# Patient Record
Sex: FEMALE | Race: WHITE
[De-identification: names, ages, dates, MRNs, and addresses within clinical notes are randomized per-mention and may not be internally consistent; named-entity substitution may affect disease eponyms.]

---

## 2021-06-30 ENCOUNTER — HOSPITAL ENCOUNTER (EMERGENCY)
Age: 36
Discharge: HOME | End: 2021-06-30
Payer: COMMERCIAL

## 2021-06-30 VITALS
DIASTOLIC BLOOD PRESSURE: 67 MMHG | RESPIRATION RATE: 16 BRPM | TEMPERATURE: 98.78 F | HEART RATE: 74 BPM | OXYGEN SATURATION: 100 % | SYSTOLIC BLOOD PRESSURE: 119 MMHG

## 2021-06-30 VITALS
SYSTOLIC BLOOD PRESSURE: 119 MMHG | OXYGEN SATURATION: 100 % | HEART RATE: 74 BPM | TEMPERATURE: 98.8 F | DIASTOLIC BLOOD PRESSURE: 67 MMHG | RESPIRATION RATE: 16 BRPM

## 2021-06-30 VITALS
HEART RATE: 74 BPM | TEMPERATURE: 98.78 F | OXYGEN SATURATION: 100 % | DIASTOLIC BLOOD PRESSURE: 67 MMHG | RESPIRATION RATE: 16 BRPM | SYSTOLIC BLOOD PRESSURE: 119 MMHG

## 2021-06-30 VITALS — BODY MASS INDEX: 27.4 KG/M2 | BODY MASS INDEX: 27.3 KG/M2

## 2021-06-30 DIAGNOSIS — S61.210A: Primary | ICD-10-CM

## 2021-06-30 DIAGNOSIS — F33.1: ICD-10-CM
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
## 2021-06-30 PROCEDURE — 12001 RPR S/N/AX/GEN/TRNK 2.5CM/<: CPT

## 2021-06-30 PROCEDURE — 99202 OFFICE O/P NEW SF 15 MIN: CPT

## 2021-06-30 PROCEDURE — G0463 HOSPITAL OUTPT CLINIC VISIT: HCPCS

## 2021-09-05 ENCOUNTER — HOSPITAL ENCOUNTER (OUTPATIENT)
Age: 36
Discharge: HOME | End: 2021-09-05
Payer: COMMERCIAL

## 2021-09-05 VITALS
DIASTOLIC BLOOD PRESSURE: 64 MMHG | OXYGEN SATURATION: 98 % | HEART RATE: 76 BPM | SYSTOLIC BLOOD PRESSURE: 107 MMHG | RESPIRATION RATE: 16 BRPM

## 2021-09-05 VITALS
HEART RATE: 75 BPM | DIASTOLIC BLOOD PRESSURE: 63 MMHG | OXYGEN SATURATION: 97 % | SYSTOLIC BLOOD PRESSURE: 101 MMHG | RESPIRATION RATE: 16 BRPM

## 2021-09-05 VITALS
OXYGEN SATURATION: 98 % | HEART RATE: 82 BPM | SYSTOLIC BLOOD PRESSURE: 123 MMHG | DIASTOLIC BLOOD PRESSURE: 68 MMHG | RESPIRATION RATE: 16 BRPM

## 2021-09-05 VITALS
OXYGEN SATURATION: 97 % | DIASTOLIC BLOOD PRESSURE: 87 MMHG | TEMPERATURE: 99.14 F | RESPIRATION RATE: 16 BRPM | SYSTOLIC BLOOD PRESSURE: 120 MMHG | HEART RATE: 83 BPM

## 2021-09-05 VITALS
OXYGEN SATURATION: 97 % | SYSTOLIC BLOOD PRESSURE: 119 MMHG | HEART RATE: 74 BPM | DIASTOLIC BLOOD PRESSURE: 73 MMHG | RESPIRATION RATE: 16 BRPM

## 2021-09-05 VITALS
DIASTOLIC BLOOD PRESSURE: 65 MMHG | RESPIRATION RATE: 16 BRPM | OXYGEN SATURATION: 97 % | HEART RATE: 69 BPM | SYSTOLIC BLOOD PRESSURE: 102 MMHG

## 2021-09-05 VITALS
DIASTOLIC BLOOD PRESSURE: 71 MMHG | RESPIRATION RATE: 16 BRPM | SYSTOLIC BLOOD PRESSURE: 109 MMHG | OXYGEN SATURATION: 96 % | HEART RATE: 63 BPM

## 2021-09-05 VITALS
OXYGEN SATURATION: 98 % | HEART RATE: 80 BPM | RESPIRATION RATE: 16 BRPM | DIASTOLIC BLOOD PRESSURE: 75 MMHG | SYSTOLIC BLOOD PRESSURE: 116 MMHG

## 2021-09-05 DIAGNOSIS — U07.1: Primary | ICD-10-CM

## 2021-09-05 PROCEDURE — 96365 THER/PROPH/DIAG IV INF INIT: CPT

## 2021-09-16 ENCOUNTER — HOSPITAL ENCOUNTER (EMERGENCY)
Age: 36
Discharge: HOME | End: 2021-09-16
Payer: COMMERCIAL

## 2021-09-16 VITALS
HEART RATE: 75 BPM | DIASTOLIC BLOOD PRESSURE: 81 MMHG | RESPIRATION RATE: 16 BRPM | SYSTOLIC BLOOD PRESSURE: 128 MMHG | OXYGEN SATURATION: 100 % | TEMPERATURE: 98.2 F

## 2021-09-16 VITALS — HEART RATE: 75 BPM | RESPIRATION RATE: 16 BRPM | OXYGEN SATURATION: 100 % | TEMPERATURE: 98.2 F

## 2021-09-16 VITALS — BODY MASS INDEX: 27.9 KG/M2 | BODY MASS INDEX: 28 KG/M2

## 2021-09-16 DIAGNOSIS — S62.607A: Primary | ICD-10-CM

## 2021-09-16 DIAGNOSIS — Z88.2: ICD-10-CM

## 2021-09-16 PROCEDURE — 73130 X-RAY EXAM OF HAND: CPT

## 2021-09-16 PROCEDURE — 99202 OFFICE O/P NEW SF 15 MIN: CPT

## 2021-09-16 PROCEDURE — G0463 HOSPITAL OUTPT CLINIC VISIT: HCPCS

## 2021-09-17 ENCOUNTER — HOSPITAL ENCOUNTER (OUTPATIENT)
Age: 36
End: 2021-09-17
Payer: COMMERCIAL

## 2021-09-17 DIAGNOSIS — S62.657A: Primary | ICD-10-CM

## 2021-09-17 PROCEDURE — 73140 X-RAY EXAM OF FINGER(S): CPT

## 2022-04-19 ENCOUNTER — HOSPITAL ENCOUNTER (EMERGENCY)
Age: 37
Discharge: HOME | End: 2022-04-19
Payer: COMMERCIAL

## 2022-04-19 VITALS
TEMPERATURE: 98.06 F | HEART RATE: 76 BPM | RESPIRATION RATE: 18 BRPM | DIASTOLIC BLOOD PRESSURE: 81 MMHG | SYSTOLIC BLOOD PRESSURE: 129 MMHG

## 2022-04-19 VITALS
OXYGEN SATURATION: 99 % | SYSTOLIC BLOOD PRESSURE: 129 MMHG | TEMPERATURE: 98 F | RESPIRATION RATE: 18 BRPM | HEART RATE: 76 BPM | DIASTOLIC BLOOD PRESSURE: 81 MMHG

## 2022-04-19 VITALS — BODY MASS INDEX: 29.7 KG/M2

## 2022-04-19 DIAGNOSIS — Z79.899: ICD-10-CM

## 2022-04-19 DIAGNOSIS — J02.9: ICD-10-CM

## 2022-04-19 DIAGNOSIS — F33.1: ICD-10-CM

## 2022-04-19 DIAGNOSIS — H66.002: Primary | ICD-10-CM

## 2022-04-19 DIAGNOSIS — Z88.2: ICD-10-CM

## 2022-04-19 PROCEDURE — G0463 HOSPITAL OUTPT CLINIC VISIT: HCPCS

## 2022-04-19 PROCEDURE — 87430 STREP A AG IA: CPT

## 2022-04-19 PROCEDURE — 99212 OFFICE O/P EST SF 10 MIN: CPT

## 2023-09-20 ENCOUNTER — TELEPHONE (OUTPATIENT)
Dept: CARDIOLOGY | Facility: CLINIC | Age: 38
End: 2023-09-20
Payer: COMMERCIAL

## 2023-09-20 NOTE — TELEPHONE ENCOUNTER
LVM FOR PT TO CALL US BACK TO SCHEDULE NEW CARDIAC REFERRAL FROM DR. HORTENSIA MIRANDA.  1ST ATTEMPT.  HUB OK TO READ AND SCHEDULE WITH 1ST AVAILABLE APPT IN OUR [facility] OFFICE.  1ST APPT IS OK.

## 2023-09-27 ENCOUNTER — OFFICE VISIT (OUTPATIENT)
Dept: CARDIOLOGY | Facility: CLINIC | Age: 38
End: 2023-09-27
Payer: COMMERCIAL

## 2023-09-27 VITALS
DIASTOLIC BLOOD PRESSURE: 70 MMHG | BODY MASS INDEX: 24.86 KG/M2 | HEIGHT: 68 IN | SYSTOLIC BLOOD PRESSURE: 148 MMHG | HEART RATE: 84 BPM | OXYGEN SATURATION: 97 % | WEIGHT: 164 LBS

## 2023-09-27 DIAGNOSIS — R07.2 PRECORDIAL CHEST PAIN: Primary | ICD-10-CM

## 2023-09-27 PROCEDURE — 93000 ELECTROCARDIOGRAM COMPLETE: CPT | Performed by: NURSE PRACTITIONER

## 2023-09-27 PROCEDURE — 99204 OFFICE O/P NEW MOD 45 MIN: CPT | Performed by: NURSE PRACTITIONER

## 2023-09-27 RX ORDER — MONTELUKAST SODIUM 10 MG/1
1 TABLET ORAL DAILY
COMMUNITY
Start: 2023-08-01

## 2023-09-27 RX ORDER — DOXYCYCLINE HYCLATE 100 MG/1
100 TABLET, DELAYED RELEASE ORAL 2 TIMES DAILY
COMMUNITY

## 2023-09-27 NOTE — ASSESSMENT & PLAN NOTE
Intermittent episodes of chest tightness associated with tachycardia and visual disturbances.  -Stress test and echocardiogram for further evaluation.   Attending Attestation (For Attendings USE Only)...

## 2023-09-27 NOTE — PROGRESS NOTES
[facility] Provider Note     Date:   2023   Name: Homa Gibbs  :   1985  PCP: Min Coulter MD    Chief Complaint   Patient presents with    CARDIAC CONSULT       Subjective     History of Present Illness  Homa Gibbs is a 38 y.o. female who presents today for new patient evaluation for chest tightness.  Patient reports intermittent episodes of chest tightness that occur randomly and resolve after several seconds.  She will subsequently get visual abnormalities and at times has noticed an elevated heart rate.  She describes it as a squeezing/tightening of her heart with an associated pounding sensation.  The episodes started approximately 2 years ago and suspects that it may be correlated with getting the COVID-vaccine.  She denies palpitations, shortness of breath, lower extremity edema, dizziness or syncope.  She has no prior cardiac history.  No family history of coronary artery disease or congestive heart failure.  EKG today shows sinus bradycardia with a heart rate of 59 bpm.  RSR in V1/V2 consistent with right ventricular conduction delay.    No specialty comments available.     Reports Denies   Chest Pain [x] []   Shortness of Air [] [x]   Palpitations [] [x]   Edema [] [x]   Dizziness [] [x]   Syncope [] [x]       Allergies   Allergen Reactions    Sulfa Antibiotics Swelling     Tongue swelling    Bactrim [Sulfamethoxazole-Trimethoprim] Irritability       Current Outpatient Medications:     doxycycline (DORYX) 100 MG enteric coated tablet, Take 1 tablet by mouth 2 (Two) Times a Day., Disp: , Rfl:     montelukast (SINGULAIR) 10 MG tablet, Take 1 tablet by mouth Daily., Disp: , Rfl:     Past Medical History:   Diagnosis Date    Allergic rhinitis     Anxiety     Rosacea       History reviewed. No pertinent surgical history.  History reviewed. No pertinent family history.  Social History     Socioeconomic History    Marital status:    Tobacco Use    Smoking status:  "Never     Passive exposure: Never    Smokeless tobacco: Never   Vaping Use    Vaping Use: Never used   Substance and Sexual Activity    Alcohol use: Yes    Drug use: Not Currently     Comment: past used    Sexual activity: Defer       Objective     Vital Signs:  /70   Pulse 84   Ht 172.7 cm (68\")   Wt 74.4 kg (164 lb)   SpO2 97%   BMI 24.94 kg/m²   Estimated body mass index is 24.94 kg/m² as calculated from the following:    Height as of this encounter: 172.7 cm (68\").    Weight as of this encounter: 74.4 kg (164 lb).       BMI is within normal parameters. No other follow-up for BMI required.      Physical Exam  Constitutional:       Appearance: Normal appearance. She is well-developed.   HENT:      Head: Normocephalic and atraumatic.   Eyes:      Pupils: Pupils are equal, round, and reactive to light.   Neck:      Vascular: No carotid bruit.   Cardiovascular:      Rate and Rhythm: Normal rate and regular rhythm.      Pulses: Normal pulses.      Heart sounds: Normal heart sounds. No murmur heard.  Pulmonary:      Breath sounds: Normal breath sounds. No wheezing or rhonchi.   Musculoskeletal:      Right lower leg: No edema.      Left lower leg: No edema.   Skin:     Capillary Refill: Capillary refill takes less than 2 seconds.      Coloration: Skin is not cyanotic.      Nails: There is no clubbing.   Neurological:      Mental Status: She is alert and oriented to person, place, and time.      Motor: No weakness.      Gait: Gait normal.   Psychiatric:         Mood and Affect: Mood normal.         Behavior: Behavior is cooperative.         Thought Content: Thought content normal.         Cognition and Memory: Memory normal.               ECG 12 Lead    Date/Time: 9/27/2023 10:23 AM  Performed by: Abigail Merchant APRN  Authorized by: Abigail Merchant APRN   Comparison: not compared with previous ECG   Previous ECG: no previous ECG available  Rhythm: sinus bradycardia  Rate: bradycardic  BPM: " 59  Conduction comments: Right ventricular conduction delay  QRS axis: normal    Clinical impression: non-specific ECG         Assessment and Plan     Diagnoses and all orders for this visit:    1. Precordial chest pain (Primary)  Assessment & Plan:  Intermittent episodes of chest tightness associated with tachycardia and visual disturbances.  -Stress test and echocardiogram for further evaluation.    Orders:  -     Adult Transthoracic Echo Complete W/ Cont if Necessary Per Protocol; Future  -     Adult Stress Echo W/ Cont or Stress Agent if Necessary Per Protocol; Future        Recommendations: ER if symptoms increase and Report if any new/changing symptoms immediately          Follow Up  Return in about 4 weeks (around 10/25/2023) for cardiac testing results.  Patient was given instructions and counseling regarding her condition or for health maintenance advice. Please see specific information pulled into the AVS if appropriate.

## 2023-11-06 ENCOUNTER — OFFICE VISIT (OUTPATIENT)
Dept: CARDIOLOGY | Facility: CLINIC | Age: 38
End: 2023-11-06
Payer: COMMERCIAL

## 2023-11-06 VITALS
OXYGEN SATURATION: 96 % | BODY MASS INDEX: 24.71 KG/M2 | DIASTOLIC BLOOD PRESSURE: 76 MMHG | HEIGHT: 68 IN | SYSTOLIC BLOOD PRESSURE: 118 MMHG | HEART RATE: 96 BPM | WEIGHT: 163 LBS

## 2023-11-06 DIAGNOSIS — R07.2 PRECORDIAL CHEST PAIN: Primary | ICD-10-CM

## 2023-11-06 PROCEDURE — 99213 OFFICE O/P EST LOW 20 MIN: CPT | Performed by: INTERNAL MEDICINE

## 2023-11-09 NOTE — PROGRESS NOTES
"    [facility] Provider Note     Date:   2023   Name: Homa Gibbs  :   1985  PCP: Min Coulter MD    Chief Complaint   Patient presents with    Follow-up     Stress echo results       Subjective     History of Present Illness  Homa Gibbs is a 38 y.o. female who presents today for follow-up after cardiac testing.  Results were reviewed with her.  She does not have any new symptoms.  She does have a pinpoint chest pain that come on for the last couple years and has been random.  She did not have any during exertion.  Her stress test today was overall low risk.  She has not had any syncope or lightheadedness.  Blood pressures well controlled.  We discussed that we would follow her symptomatically.  This likely is musculoskeletal or GI related.  But if any new symptoms were to occur she will have a follow-up to be able to discuss them.    No specialty comments available.    Allergies   Allergen Reactions    Sulfa Antibiotics Swelling     Tongue swelling    Bactrim [Sulfamethoxazole-Trimethoprim] Irritability       Current Outpatient Medications:     doxycycline (DORYX) 100 MG enteric coated tablet, Take 1 tablet by mouth 2 (Two) Times a Day., Disp: , Rfl:     montelukast (SINGULAIR) 10 MG tablet, Take 1 tablet by mouth Daily., Disp: , Rfl:     Past Medical History:   Diagnosis Date    Allergic rhinitis     Anxiety     Rosacea       History reviewed. No pertinent surgical history.  History reviewed. No pertinent family history.  Social History     Socioeconomic History    Marital status:    Tobacco Use    Smoking status: Never     Passive exposure: Never    Smokeless tobacco: Never   Vaping Use    Vaping Use: Never used   Substance and Sexual Activity    Alcohol use: Yes    Drug use: Not Currently     Comment: past used    Sexual activity: Defer       Objective     Vital Signs:  /76   Pulse 96   Ht 172.7 cm (68\")   Wt 73.9 kg (163 lb)   SpO2 96%   BMI 24.78 kg/m² " "  Estimated body mass index is 24.78 kg/m² as calculated from the following:    Height as of this encounter: 172.7 cm (68\").    Weight as of this encounter: 73.9 kg (163 lb).         Physical Exam  Vitals reviewed.   Constitutional:       General: She is not in acute distress.     Appearance: Normal appearance.   HENT:      Head: Normocephalic and atraumatic.      Mouth/Throat:      Mouth: Mucous membranes are moist.   Eyes:      Conjunctiva/sclera: Conjunctivae normal.   Neck:      Vascular: No carotid bruit.   Cardiovascular:      Rate and Rhythm: Normal rate and regular rhythm.      Pulses: Normal pulses.      Heart sounds: Normal heart sounds. No murmur heard.  Pulmonary:      Effort: Pulmonary effort is normal. No respiratory distress.      Breath sounds: Normal breath sounds. No wheezing or rhonchi.   Abdominal:      General: Abdomen is flat.      Palpations: Abdomen is soft.   Musculoskeletal:      Cervical back: Normal range of motion and neck supple.      Right lower leg: No edema.      Left lower leg: No edema.   Skin:     General: Skin is warm and dry.      Coloration: Skin is not jaundiced.   Neurological:      General: No focal deficit present.      Mental Status: She is alert and oriented to person, place, and time. Mental status is at baseline.      GCS: GCS eye subscore is 4. GCS verbal subscore is 5. GCS motor subscore is 6.      Cranial Nerves: No cranial nerve deficit.      Motor: No weakness.      Gait: Gait normal.   Psychiatric:         Mood and Affect: Mood and affect normal. Mood is not anxious.         Speech: Speech normal.         Behavior: Behavior normal.                     Assessment and Plan     Diagnoses and all orders for this visit:    1. Precordial chest pain (Primary)  Comments:  Low risk stress test.  Limitations of stress testing reviewed.  Follow-up to ensure stability of symptoms.        Recommendations: Report if any new/changing symptoms immediately      Follow Up  Return " in about 3 months (around 2/6/2024) for Recheck symptoms, Patient OK with NP visit.    Isabel Crystal MD   11/06/2023     Please note that this explicitly excludes time spent on other separate billable services such as performing procedures or test interpretation, when applicable.    This note was created using dictation software which occasionally transcribes nonsensical phrases. Please contact the provider if any clarification is needed.

## 2025-02-19 ENCOUNTER — HOSPITAL ENCOUNTER (OUTPATIENT)
Dept: HOSPITAL 22 - RAD | Age: 40
Discharge: HOME | End: 2025-02-19
Payer: COMMERCIAL

## 2025-02-19 DIAGNOSIS — Z12.31: Primary | ICD-10-CM

## 2025-02-19 PROCEDURE — 77063 BREAST TOMOSYNTHESIS BI: CPT

## 2025-02-19 PROCEDURE — 77067 SCR MAMMO BI INCL CAD: CPT

## 2025-02-19 NOTE — MM_ITS
PROCEDURE INFORMATION:  
Exam:  Bilateral Screening 3D Mammography  
Exam date and time: 2/19/2025 8:00 AM  
Age: 39 years old  
Clinical indication: Screening mammogram  
 
TECHNIQUE:  
Imaging protocol: Bilateral Screening tomosynthesis and 2D  
mammography  
including computer-aided detection (CAD) when performed.  
 
COMPARISON:  
 [facility] DIG MAMM-DX BILAT W/ADD VIEWS 10/17/2012 1:07 PM  
 
FINDINGS:  
 
MAMMOGRAPHY:  
Breast composition: The breast tissue is extremely dense, which  
lowers the  
sensitivity of mammography.  
Mass: None.  
Architectural distortion: No new or suspicious architectural  
distortion.  
Calcifications: No new or suspicious calcifications are present  
Asymmetric density: No new or suspicious asymmetric density is  
present  
Skin thickening: None.  
Axillary adenopathy: None.  
 
IMPRESSION:  
No mammographic evidence of malignancy. Recommend annual screening  
mammography  
unless otherwise clinically indicated.  
 
ASSESSMENT:  
BI-RADS category 1: Negative.  
 
Electronically signed by Regino Arce MD at 02/20/2025 14:51

## 2025-06-11 ENCOUNTER — TELEPHONE (OUTPATIENT)
Dept: CARDIOLOGY | Facility: CLINIC | Age: 40
End: 2025-06-11
Payer: COMMERCIAL

## 2025-06-11 NOTE — TELEPHONE ENCOUNTER
PT LVM FOR CALLBACK. PT STATED SHE HAD AN EPISODE 6/7 WHILE DRIVING SHE HAD CHEST PRESSURE WITH RAPID HR FOLLOWING [employer]. SHE STATED ON 6/8 SHE FELT VERY EXHAUSTED WITH NO ENERGY, BY MONDAY SHE WAS BETTER. SHE WAS LAST SEEN ON 11/06/23. I SCHEDULED PT AND ADVISED HER IF HER SYMPTOMS GOT WORSE TO GO THE ER ALSO TO CALL THE OFFICE IF EPISODES BECOME MORE FREQUENT AND WE CAN GET HER IN TO SEE A NP.